# Patient Record
Sex: MALE | Race: WHITE | Employment: OTHER | URBAN - METROPOLITAN AREA
[De-identification: names, ages, dates, MRNs, and addresses within clinical notes are randomized per-mention and may not be internally consistent; named-entity substitution may affect disease eponyms.]

---

## 2021-08-27 ENCOUNTER — HOSPITAL ENCOUNTER (OUTPATIENT)
Age: 71
Setting detail: SPECIMEN
Discharge: HOME | End: 2021-08-27
Payer: MEDICARE

## 2021-08-31 LAB — DERMATOLOGY PATHOLOGY REPORT: NORMAL

## 2022-10-17 ENCOUNTER — HOSPITAL ENCOUNTER (EMERGENCY)
Age: 72
Discharge: HOME | End: 2022-10-17
Attending: EMERGENCY MEDICINE
Payer: MEDICARE

## 2022-10-17 ENCOUNTER — APPOINTMENT (OUTPATIENT)
Dept: CT IMAGING | Age: 72
End: 2022-10-17
Payer: MEDICARE

## 2022-10-17 VITALS
TEMPERATURE: 98 F | HEIGHT: 73 IN | RESPIRATION RATE: 16 BRPM | SYSTOLIC BLOOD PRESSURE: 142 MMHG | DIASTOLIC BLOOD PRESSURE: 91 MMHG | BODY MASS INDEX: 25.84 KG/M2 | OXYGEN SATURATION: 96 % | WEIGHT: 195 LBS | HEART RATE: 73 BPM

## 2022-10-17 DIAGNOSIS — S09.90XA INJURY OF HEAD, INITIAL ENCOUNTER: Primary | ICD-10-CM

## 2022-10-17 DIAGNOSIS — S60.511A ABRASION OF RIGHT HAND, INITIAL ENCOUNTER: ICD-10-CM
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
## 2022-10-17 PROCEDURE — 99284 EMERGENCY DEPT VISIT MOD MDM: CPT

## 2022-10-17 PROCEDURE — 6370000000 HC RX 637 (ALT 250 FOR IP): Performed by: EMERGENCY MEDICINE

## 2022-10-17 PROCEDURE — 72125 CT NECK SPINE W/O DYE: CPT

## 2022-10-17 PROCEDURE — 70450 CT HEAD/BRAIN W/O DYE: CPT

## 2022-10-17 RX ORDER — GINSENG 100 MG
CAPSULE ORAL ONCE
Status: COMPLETED | OUTPATIENT
Start: 2022-10-17 | End: 2022-10-17

## 2022-10-17 RX ADMIN — BACITRACIN: 500 OINTMENT TOPICAL at 14:29

## 2022-10-17 ASSESSMENT — PAIN - FUNCTIONAL ASSESSMENT: PAIN_FUNCTIONAL_ASSESSMENT: 0-10

## 2022-10-17 ASSESSMENT — PAIN DESCRIPTION - LOCATION: LOCATION: FACE;HAND

## 2022-10-17 ASSESSMENT — LIFESTYLE VARIABLES
HOW OFTEN DO YOU HAVE A DRINK CONTAINING ALCOHOL: NEVER
HOW MANY STANDARD DRINKS CONTAINING ALCOHOL DO YOU HAVE ON A TYPICAL DAY: PATIENT DOES NOT DRINK

## 2022-10-17 ASSESSMENT — PAIN SCALES - GENERAL: PAINLEVEL_OUTOF10: 2

## 2022-10-17 ASSESSMENT — PAIN DESCRIPTION - ORIENTATION: ORIENTATION: RIGHT

## 2022-10-17 NOTE — ED PROVIDER NOTES
[id number] [facility]  [address] [facility]. [facility] [address]  Phone: [phone]  Fax: Ratna Padron 6299      Pt Name: Nora Laguerre  MRN: [id number]  [place] 1950  Date of evaluation: 10/17/2022    CHIEF COMPLAINT       Chief Complaint   Patient presents with    Lorella [place] hit side of face and rt hand       HISTORY OF PRESENT ILLNESS    Nora Laguerre is a 67 y.o. male who presents to the emergency department following a fall. He was walking when he tripped on a sidewalk and landed on his face and scuffed up his right hand. He denies any loss of consciousness no neck pain. He denies blood thinners. No blurry vision or double vision. No paresthesias or weakness. No preceding symptoms mechanical fall. Pain 2 out of 10 does not want anything for the pain    REVIEW OF SYSTEMS       Constitutional: No fevers or chills   HEENT: No sore throat, rhinorrhea, or earache   Eyes: No blurry vision or double vision no drainage   Cardiovascular: No chest pain or tachycardia   Respiratory: No wheezing or shortness of breath no cough   Gastrointestinal: No nausea, vomiting, diarrhea, constipation, or abdominal pain   : No hematuria or dysuria   Musculoskeletal: Positive right hand abrasion no swelling or pain  Skin: Positive facial abrasions and right hand abrasion  Neurological: No focal neurologic complaints, paresthesias, weakness, or headache     PAST MEDICAL HISTORY    has no past medical history on file. SURGICAL HISTORY      has a past surgical history that includes Nasal septum surgery and lipoma resection (Left). CURRENT MEDICATIONS       Previous Medications    No medications on file       ALLERGIES     has No Known Allergies. FAMILY HISTORY     has no family status information on file. family history is not on file. SOCIAL HISTORY      reports that he has quit smoking. His smoking use included cigarettes.  He has never used smokeless tobacco. He reports that he does not drink alcohol and does not use drugs. PHYSICAL EXAM       ED Triage Vitals [10/17/22 1342]   BP Temp Temp Source Heart Rate Resp SpO2 Height Weight   (!) 142/91 98 °F (36.7 °C) Oral 73 16 96 % 6' 1\" (1.854 m) 195 lb (88.5 kg)       Constitutional: Alert, oriented x3, nontoxic, answering questions appropriately, acting properly for age, in no acute distress   HEENT: Extraocular muscles intact, mucus membranes moist, ecchymosis with swelling over the lateral aspect of the superior aspect of the orbit no step-off or deformity. No signs of entrapment. Abrasion to the chin no posterior pharyngeal erythema or exudates, Pupils equal, round, reactive to light,   Neck: Trachea midline no posterior midline neck tenderness palpation  Cardiovascular: Regular rhythm and rate no murmurs   Respiratory: Clear to auscultation bilaterally no wheezes, rhonchi, rales, no respiratory distress no tachypnea no retractions no hypoxia  Gastrointestinal: Soft, nontender, nondistended, positive bowel sounds. No rebound, rigidity, or guarding. Musculoskeletal: No extremity pain or swelling. Right upper extremity no pain at the shoulder elbow or wrist.  Radial and ulnar arteries intact and capillary for less than 2 seconds. Abrasion over the distal third metacarpal without deformity. No bony point tenderness full range of motion. No swelling. Neurologic: Moving all 4 extremities without difficulty there are no gross focal neurologic deficits   Skin: Warm and dry       DIFFERENTIAL DIAGNOSIS/ MDM:     Tetanus up-to-date. CT head and cervical spine. Bacitracin dressing. DIAGNOSTIC RESULTS     EKG: All EKG's are interpreted by the Emergency Department Physician who either signs or Co-signs this chart in the absence of a cardiologist.        Not indicated unless otherwise documented above    LABS:  No results found for this visit on 10/17/22.     Not indicated unless otherwise documented above    RADIOLOGY: I reviewed the radiologist interpretations:    CT HEAD WO CONTRAST   Final Result   No acute intracranial or cervical spine abnormality. Multilevel degenerative disc disease, moderate and greatest at C3-C4. Trace anterolisthesis at C4-C5, likely degenerative. CT CERVICAL SPINE WO CONTRAST   Final Result   No acute intracranial or cervical spine abnormality. Multilevel degenerative disc disease, moderate and greatest at C3-C4. Trace anterolisthesis at C4-C5, likely degenerative. Not indicated unless otherwise documented above    EMERGENCY DEPARTMENT COURSE:     The patient was given the following medications:  Orders Placed This Encounter   Medications    bacitracin ointment        Vitals:   -------------------------  BP (!) 142/91   Pulse 73   Temp 98 °F (36.7 °C) (Oral)   Resp 16   Ht 6' 1\" (1.854 m)   Wt 88.5 kg (195 lb)   SpO2 96%   BMI 25.73 kg/m²     3:25 PM CT head and cervical spine unremarkable for acute process. Supportive care. Expect some worsening soreness over the next 24 to 48 hours. Abrasions watch for signs of infection. Follow-up with family physician for reevaluation return if worsening symptoms or any other concerns. The patient understands that at this time there is no evidence for a more malignant underlying process, but also understands that early in the process of an illness or injury, an emergency department workup can be falsely reassuring. Routine discharge counseling was given, and it is understood that worsening, changing or persistent symptoms should prompt an immediate call or follow up with their primary physician or return to the emergency department. The importance of appropriate follow up was also discussed. I have reviewed the disposition diagnosis. I have answered the questions and given discharge instructions. There was voiced understanding of these instructions and no further questions or complaints.     CRITICAL CARE:    None    CONSULTS:    None    PROCEDURES:    None      OARRS Report if indicated             FINAL IMPRESSION      1. Injury of head, initial encounter    2. Fall, initial encounter    3.  Abrasion of right hand, initial encounter          DISPOSITION/PLAN   DISPOSITION Decision To Discharge 10/17/2022 03:24:41 PM        CONDITION ON DISPOSITION: STABLE       PATIENT REFERRED TO:  Savage Ortiz MD  [place] [address]  [phone]    Schedule an appointment as soon as possible for a visit in 3 days      DISCHARGE MEDICATIONS:  New Prescriptions    No medications on file       (Please note that portions of this note were completed with a voice recognition program.  Efforts were made to edit the dictations but occasionally words are mis-transcribed.)    Anjali Sood DO   Attending Emergency Physician     Anjali Sood DO  10/17/22 152

## 2022-10-17 NOTE — DISCHARGE INSTRUCTIONS
Return immediately if any worsening symptoms or any other concerns    Tell us how we did visit: [email or web address]   and let us know about your experience

## 2022-10-17 NOTE — ED NOTES
Wounds to right side of face washed with warm soap and water. Rt top of hand washed with warm soap and water. Ice pack applied to right upper eyelid.      Jude Amaya RN  10/17/22 [id number]